# Patient Record
Sex: MALE | Race: WHITE
[De-identification: names, ages, dates, MRNs, and addresses within clinical notes are randomized per-mention and may not be internally consistent; named-entity substitution may affect disease eponyms.]

---

## 2021-10-18 ENCOUNTER — HOSPITAL ENCOUNTER (EMERGENCY)
Dept: HOSPITAL 52 - LL.ED | Age: 21
Discharge: HOME | End: 2021-10-18
Payer: COMMERCIAL

## 2021-10-18 DIAGNOSIS — L50.0: Primary | ICD-10-CM

## 2021-10-18 DIAGNOSIS — Z88.1: ICD-10-CM

## 2021-10-18 DIAGNOSIS — T36.8X5A: ICD-10-CM

## 2021-10-18 PROCEDURE — 96375 TX/PRO/DX INJ NEW DRUG ADDON: CPT

## 2021-10-18 PROCEDURE — 99283 EMERGENCY DEPT VISIT LOW MDM: CPT

## 2021-10-18 PROCEDURE — 96374 THER/PROPH/DIAG INJ IV PUSH: CPT

## 2021-10-18 RX ADMIN — Medication PRN ML: at 10:24

## 2021-10-18 RX ADMIN — Medication PRN ML: at 10:25

## 2021-10-18 NOTE — EDM.PDOC
ED HPI GENERAL MEDICAL PROBLEM





- General


Chief Complaint: Allergic Reaction


Stated Complaint: allergic reaction


Time Seen by Provider: 10/18/21 10:12


Source of Information: Reports: Patient


History Limitations: Reports: No Limitations





- History of Present Illness


INITIAL COMMENTS - FREE TEXT/NARRATIVE: 





Patient presents to the ED for hives.  He was started on clindamycin for a right

great toe infection on 10/9.  He has been taking the antibiotics and the toe is 

healing well. He noted a rash on his torso 10/15.  He took some benadryl 

occasionally for it.  No respiratory involvement or difficulty swallowing.  He 

last took the clindamycin  on 10/15.  STates he had a GI reaction to amoxicillin

in the past, diarrhea, weight loss, admission for iv hydration and was told it 

was an allergic reaction but never any hives.  Otherwise healthy, no change 

other than the hives continue all over the body.  Did not check with PCP today, 

here due to the hives continuing but not worsening. 





- Related Data


                                    Allergies











Allergy/AdvReac Type Severity Reaction Status Date / Time


 


clindamycin Allergy  Hives Verified 10/18/21 10:10


 


amoxicillin AdvReac  Other Verified 10/18/21 10:10











Home Meds: 


                                    Home Meds





predniSONE [Prednisone] 40 mg PO DAILY 5 Days #10 tablet 10/18/21 [Rx]











Past Medical History





- Past Health History


Medical/Surgical History: Denies Medical/Surgical History





- Infectious Disease History


Infectious Disease History: Reports: C-Difficile





Social & Family History





- Tobacco Use


Tobacco Use Status *Q: Never Tobacco User


Second Hand Smoke Exposure: No





- Caffeine Use


Caffeine Use: Reports: Soda





- Recreational Drug Use


Recreational Drug Use: No





ED ROS ALLERGIC REACTION





- Review of Systems


Review Of Systems: See Below


Constitutional: Reports: No Symptoms


HEENT: Reports: No Symptoms


Respiratory: Reports: No Symptoms


Cardiovascular: Reports: No Symptoms


Endocrine: Reports: No Symptoms


GI/Abdominal: Reports: No Symptoms


: Reports: No Symptoms


Musculoskeletal: Reports: No Symptoms


Skin: Reports: Pruritis, Urticaria


Neurological: Reports: No Symptoms


Psychiatric: Reports: No Symptoms





ED EXAM GENERAL NO PERIP PULSE





- Physical Exam


Exam: See Below


Exam Limited By: No Limitations


General Appearance: Alert, WD/WN, No Apparent Distress


Eye Exam: Bilateral Eye: EOMI, Normal Inspection, PERRL


Ears: Normal External Exam, Normal Canal, Hearing Grossly Normal, Normal TMs


Nose: Normal Inspection, Normal Mucosa


Throat/Mouth: Normal Inspection, Normal Lips, Normal Teeth, Normal Oropharynx, 

Normal Voice, No Airway Compromise


Head: Atraumatic


Neck: Normal Inspection, Supple


Respiratory/Chest: No Respiratory Distress, Lungs Clear, Normal Breath Sounds, 

Chest Non-Tender.  No: Respiratory Distress, Wheezing


Cardiovascular: Normal Peripheral Pulses, Regular Rate, Rhythm, No Edema


Back Exam: Normal Inspection, Full Range of Motion


Extremities: Normal Inspection, Normal Range of Motion, No Pedal Edema, Normal 

Capillary Refill, Other (right great toe with healing of the lateral distal nail

 consistent with recent infection.l  no redness or drainage. no swelling)


Neurological: Alert, Oriented, CN II-XII Intact, Normal Cognition


Skin Exam: Rash (urticaria wide spread and continguous.  no open or excoriated 

lesions)





Course





- Vital Signs


Last Recorded V/S: 





                                Last Vital Signs











Temp  36.7 C   10/18/21 10:02


 


Pulse  93   10/18/21 10:02


 


Resp  18   10/18/21 10:02


 


BP  144/92 H  10/18/21 10:02


 


Pulse Ox  99   10/18/21 10:02














- Orders/Labs/Meds


Orders: 





                               Active Orders 24 hr











 Category Date Time Status


 


 Peripheral IV Care [RC] .AS DIRECTED Care  10/18/21 10:05 Ordered


 


 Sodium Chloride 0.9% [Saline Flush] Med  10/18/21 10:05 Ordered





 10 ml FLUSH ASDIRECTED PRN   


 


 Peripheral IV Insertion Adult [OM.PC] Routine Oth  10/18/21 10:05 Ordered








                                Medication Orders





Sodium Chloride (Sodium Chloride 0.9% 10 Ml Syringe)  10 ml FLUSH ASDIRECTED PRN


   PRN Reason: Keep Vein Open








Meds: 





Medications











Generic Name Dose Route Start Last Admin





  Trade Name Freq  PRN Reason Stop Dose Admin


 


Sodium Chloride  10 ml  10/18/21 10:05 





  Sodium Chloride 0.9% 10 Ml Syringe  FLUSH  





  ASDIRECTED PRN  





  Keep Vein Open  














Discontinued Medications














Generic Name Dose Route Start Last Admin





  Trade Name Freq  PRN Reason Stop Dose Admin


 


Diphenhydramine HCl  50 mg  10/18/21 10:05 





  Diphenhydramine 50 Mg/Ml Sdv  IVPUSH  10/18/21 10:06 





  ONETIME ONE  


 


Famotidine  20 mg  10/18/21 10:16 





  Famotidine 20 Mg/2 Ml Sdv  IVPUSH  10/18/21 10:17 





  ONETIME ONE  


 


Methylprednisolone Sodium Succinate  125 mg  10/18/21 10:05 





  Methylprednisolone Sodium Succinate 125 Mg/2 Ml Sdv  IVPUSH  10/18/21 10:06 





  ONETIME ONE  














- Re-Assessments/Exams


Free Text/Narrative Re-Assessment/Exam: 





10/18/21 10:32


IV established, given 125 mg solu medrol and 20 mg of pepcid.  drove himself 

here, will take benadryl at home, has had the reaction for days.  NO airway or 

throat problems.  Discussed listing clindamycin as an allergy and discussed 

probably c diff from amoxicillin or augmentin.  will inform providers of not 

Allergy.  Advised probiotics with all antibiotics.  will take steroids, pepcid 

and antihistamine.  off today due to medications and instructed to take Benadryl

 on a regular basis today.  toe is healing. needs local cares and no further 

antibiotics








10/18/21 10:47


doing well, discharge home. 





Departure





- Departure


Time of Disposition: 10:24


Disposition: Home, Self-Care 01


Condition: Good


Clinical Impression: 


 Allergic reaction caused by a drug








- Discharge Information


*PRESCRIPTION DRUG MONITORING PROGRAM REVIEWED*: Not Applicable


*COPY OF PRESCRIPTION DRUG MONITORING REPORT IN PATIENT RASHARD: Not Applicable


Prescriptions: 


predniSONE [Prednisone] 40 mg PO DAILY 5 Days #10 tablet


Instructions:  Drug Rash, Hives, Easy-to-Read, Allergies, Adult, Easy-to-Read, 

Ingrown Toenail, Clostridioides Difficile Infection, Easy-to-Read


Referrals: 


Enio Duque PA [Primary Care Provider] - 


Forms:  ED Department Discharge, ED Return to Work/School Form


Additional Instructions: 


Take over the counter pepcid 20 mg daily for the next 7 days.  Take the steroids

starting tomorrow daily until gone.  You should take benadryl every 4-6 hours 

for hives and itching 25-50 mg at a time.  This may cause sleepiness. you can 

change to zyrtec or claritin daily if desired starting tomorrow.  Jon 

clindamycin as an allergy.  Note that you had an adverse reaction to amoxicillin

( probably c difficile infection).  You are given information on this.  You 

should alwasy take a probiotic for the entire time you are on an antibiotic.  

This can be purchased over the counter in a pill form.  Return to the ED for 

difficulty breathing or swallowing.  Reactions can last up to a week after 

taking the last pill.  You can continue to soak the right great toe in epsom 

salts and water but no further antibiotics are needed.  You should follow up 

with PCP for further needs for the toe





Sepsis Event Note (ED)





- Evaluation


Sepsis Screening Result: No Definite Risk





- Focused Exam


Vital Signs: 





                                   Vital Signs











  Temp Pulse Resp BP Pulse Ox


 


 10/18/21 10:02  36.7 C  93  18  144/92 H  99














- My Orders


Last 24 Hours: 





My Active Orders





10/18/21 10:05


Peripheral IV Care [RC] .AS DIRECTED 


Sodium Chloride 0.9% [Saline Flush]   10 ml FLUSH ASDIRECTED PRN 


Peripheral IV Insertion Adult [OM.PC] Routine 














- Assessment/Plan


Last 24 Hours: 





My Active Orders





10/18/21 10:05


Peripheral IV Care [RC] .AS DIRECTED 


Sodium Chloride 0.9% [Saline Flush]   10 ml FLUSH ASDIRECTED PRN 


Peripheral IV Insertion Adult [OM.PC] Routine